# Patient Record
Sex: FEMALE | Race: BLACK OR AFRICAN AMERICAN | NOT HISPANIC OR LATINO | URBAN - METROPOLITAN AREA
[De-identification: names, ages, dates, MRNs, and addresses within clinical notes are randomized per-mention and may not be internally consistent; named-entity substitution may affect disease eponyms.]

---

## 2022-01-20 ENCOUNTER — LAB OUTSIDE AN ENCOUNTER (OUTPATIENT)
Dept: URBAN - METROPOLITAN AREA CLINIC 109 | Facility: CLINIC | Age: 72
End: 2022-01-20

## 2022-01-20 ENCOUNTER — WEB ENCOUNTER (OUTPATIENT)
Dept: URBAN - METROPOLITAN AREA CLINIC 109 | Facility: CLINIC | Age: 72
End: 2022-01-20

## 2022-01-20 ENCOUNTER — OFFICE VISIT (OUTPATIENT)
Dept: URBAN - METROPOLITAN AREA CLINIC 109 | Facility: CLINIC | Age: 72
End: 2022-01-20
Payer: MEDICARE

## 2022-01-20 DIAGNOSIS — I10 PRIMARY HYPERTENSION: ICD-10-CM

## 2022-01-20 DIAGNOSIS — K50.819 CROHN'S DISEASE OF SMALL AND LARGE INTESTINES WITH COMPLICATION: ICD-10-CM

## 2022-01-20 PROBLEM — 59621000: Status: ACTIVE | Noted: 2022-01-20

## 2022-01-20 PROCEDURE — 99204 OFFICE O/P NEW MOD 45 MIN: CPT | Performed by: INTERNAL MEDICINE

## 2022-01-20 PROCEDURE — 99203 OFFICE O/P NEW LOW 30 MIN: CPT | Performed by: INTERNAL MEDICINE

## 2022-01-20 RX ORDER — LOSARTAN POTASSIUM 50 MG/1
1 TABLET TABLET, FILM COATED ORAL ONCE A DAY
Status: ACTIVE | COMMUNITY

## 2022-01-20 NOTE — HPI-TODAY'S VISIT:
The patient is referred for care of suspected Crohn's disease.   She has been having sx since the 1980s and initially felt to have UC when living in [place]. The patient had been under the care of Dr. Kai Singh subsequently and had multiple normal colonoscopies and no evidence of UC.  A small bowel series in 2012 revealed distal terminal ileal narrowing suggestive of Crohns disease and she underwent attempted single balloon enteroscopy from below by Dr. Alonso Egan.  The TI could not be intubated but a limited view suggested inflammation and biopsies taken proving inflammation.  She was subsequently referred to a surgeon, Dr. Melvin Benavides who recommended right hemicolectomy for her symptomatic stricturing disease. The patient reports having surgery in 2012 or 2013. Since then she has had mostly diarrhea.  No pain or bleeding.  Stools are meal triggered typically 3x daily.  She has been on Welchol which does help.  She takes 2 or 3 daily and takes Imodium. She reports not having a colonoscopy for several years and the last told that Crohn's is probably still there. Currently she has never been on medications for Crohn's.  Entyvio was suggested, but patient was not comfortable with this idea. She smokes marijuana for relaxation. No tobacco use generally.

## 2022-02-18 PROBLEM — 71833008: Status: ACTIVE | Noted: 2022-01-20

## 2022-02-23 ENCOUNTER — TELEPHONE ENCOUNTER (OUTPATIENT)
Dept: URBAN - METROPOLITAN AREA CLINIC 109 | Facility: CLINIC | Age: 72
End: 2022-02-23

## 2022-03-02 ENCOUNTER — CLAIMS CREATED FROM THE CLAIM WINDOW (OUTPATIENT)
Dept: URBAN - METROPOLITAN AREA CLINIC 4 | Facility: CLINIC | Age: 72
End: 2022-03-02
Payer: MEDICARE

## 2022-03-02 ENCOUNTER — OFFICE VISIT (OUTPATIENT)
Dept: URBAN - METROPOLITAN AREA SURGERY CENTER 23 | Facility: SURGERY CENTER | Age: 72
End: 2022-03-02
Payer: MEDICARE

## 2022-03-02 DIAGNOSIS — K50.812 CROHN'S DISEASE OF BOTH SMALL AND LARGE INTESTINE WITH INTESTINAL OBSTRUCTION: ICD-10-CM

## 2022-03-02 DIAGNOSIS — K91.89 OTHER POSTPROCEDURAL COMPLICATIONS AND DISORDERS OF DIGESTIVE SYSTEM: ICD-10-CM

## 2022-03-02 PROCEDURE — 45380 COLONOSCOPY AND BIOPSY: CPT | Performed by: INTERNAL MEDICINE

## 2022-03-02 PROCEDURE — 88342 IMHCHEM/IMCYTCHM 1ST ANTB: CPT | Performed by: PATHOLOGY

## 2022-03-02 PROCEDURE — G8907 PT DOC NO EVENTS ON DISCHARG: HCPCS | Performed by: INTERNAL MEDICINE

## 2022-03-02 PROCEDURE — 88305 TISSUE EXAM BY PATHOLOGIST: CPT | Performed by: PATHOLOGY

## 2022-03-02 PROCEDURE — 88341 IMHCHEM/IMCYTCHM EA ADD ANTB: CPT | Performed by: PATHOLOGY

## 2022-03-02 RX ORDER — LOSARTAN POTASSIUM 50 MG/1
1 TABLET TABLET, FILM COATED ORAL ONCE A DAY
Status: ACTIVE | COMMUNITY

## 2022-05-10 ENCOUNTER — LAB OUTSIDE AN ENCOUNTER (OUTPATIENT)
Dept: URBAN - METROPOLITAN AREA CLINIC 109 | Facility: CLINIC | Age: 72
End: 2022-05-10

## 2022-05-10 ENCOUNTER — OFFICE VISIT (OUTPATIENT)
Dept: URBAN - METROPOLITAN AREA CLINIC 109 | Facility: CLINIC | Age: 72
End: 2022-05-10
Payer: MEDICARE

## 2022-05-10 DIAGNOSIS — F40.240 CLAUSTROPHOBIA: ICD-10-CM

## 2022-05-10 DIAGNOSIS — K50.019 CROHN'S DISEASE OF ILEUM WITH COMPLICATION: ICD-10-CM

## 2022-05-10 PROBLEM — 19887002: Status: ACTIVE | Noted: 2022-05-10

## 2022-05-10 PROCEDURE — 99213 OFFICE O/P EST LOW 20 MIN: CPT | Performed by: INTERNAL MEDICINE

## 2022-05-10 RX ORDER — LOSARTAN POTASSIUM 50 MG/1
1 TABLET TABLET, FILM COATED ORAL ONCE A DAY
Status: ACTIVE | COMMUNITY

## 2022-05-10 NOTE — HPI-TODAY'S VISIT:
The patient is referred for follow up care of Crohn's disease.   Recent colonoscopy 2 months ago revealed a right delma colectomy and an ulcerated, strictured ileocolonic anastomosis which could not be traversed with the scope c/w Crohns.  Biopsies revealed chronic inflammation c/w IBD. Currently, has 2-3 BMs daily.  No blood in the stool.  No abdominal pain. Weight is stable.  She has been having sx since the 1980s and initially felt to have UC when living in [place].  A small bowel series in 2012 revealed distal terminal ileal narrowing suggestive of Crohns disease and she underwent attempted single balloon enteroscopy from below by Dr. Alonso Egan.  The TI could not be intubated but a limited view suggested inflammation and biopsies taken proving inflammation.  She was subsequently referred to a surgeon, Dr. Melvin Benavides who recommended right hemicolectomy for her symptomatic stricturing disease. The patient reports having surgery around 2015. Since then she has had mostly diarrhea.  No pain or bleeding.  Stools are meal triggered typically 3x daily.  She has been on Welchol which does help.  She takes 2 or 3 daily and takes Imodium. She reports not having a colonoscopy for several years and the last told that Crohn's is probably still there. Currently she has never been on medications for Crohn's.  Entyvio was suggested in the past, but patient was not comfortable with this idea. She smokes marijuana for relaxation. No tobacco use generally.

## 2022-06-16 ENCOUNTER — OFFICE VISIT (OUTPATIENT)
Dept: URBAN - METROPOLITAN AREA CLINIC 109 | Facility: CLINIC | Age: 72
End: 2022-06-16

## 2022-08-30 ENCOUNTER — OFFICE VISIT (OUTPATIENT)
Dept: URBAN - METROPOLITAN AREA CLINIC 109 | Facility: CLINIC | Age: 72
End: 2022-08-30

## 2022-08-30 RX ORDER — LOSARTAN POTASSIUM 50 MG/1
1 TABLET TABLET, FILM COATED ORAL ONCE A DAY
COMMUNITY

## 2022-09-26 ENCOUNTER — OFFICE VISIT (OUTPATIENT)
Dept: URBAN - METROPOLITAN AREA CLINIC 109 | Facility: CLINIC | Age: 72
End: 2022-09-26

## 2022-09-26 RX ORDER — LOSARTAN POTASSIUM 50 MG/1
1 TABLET TABLET, FILM COATED ORAL ONCE A DAY
COMMUNITY

## 2022-10-03 ENCOUNTER — OFFICE VISIT (OUTPATIENT)
Dept: URBAN - METROPOLITAN AREA CLINIC 109 | Facility: CLINIC | Age: 72
End: 2022-10-03
Payer: MEDICARE

## 2022-10-03 VITALS
HEART RATE: 68 BPM | WEIGHT: 166 LBS | BODY MASS INDEX: 26.68 KG/M2 | HEIGHT: 66 IN | DIASTOLIC BLOOD PRESSURE: 85 MMHG | SYSTOLIC BLOOD PRESSURE: 135 MMHG | TEMPERATURE: 97 F

## 2022-10-03 DIAGNOSIS — K50.019 CROHN'S DISEASE OF ILEUM WITH COMPLICATION: ICD-10-CM

## 2022-10-03 PROBLEM — 38106008: Status: ACTIVE | Noted: 2022-05-10

## 2022-10-03 PROCEDURE — 99213 OFFICE O/P EST LOW 20 MIN: CPT | Performed by: INTERNAL MEDICINE

## 2022-10-03 RX ORDER — LOSARTAN POTASSIUM 50 MG/1
1 TABLET TABLET, FILM COATED ORAL ONCE A DAY
Status: ACTIVE | COMMUNITY

## 2022-10-03 NOTE — HPI-TODAY'S VISIT:
The patient returns for follow up care of Crohn's disease.   MR enterography last month revealed multiple areas of thickening of the distal small bowel, although no fistulas. Finding were c/w Crohns recurrence of the distal ileum.  Colonoscopy in March 2022 revealed a right delma colectomy and an ulcerated, strictured ileocolonic anastomosis which could not be traversed with the scope c/w Crohns.  Biopsies revealed chronic inflammation c/w IBD. Takes Welchol for diarrhea, otherwise has 3-5 BMs per day.  Also takes Imodium PRN. No blood in the stool or abdominal pain. Weight is stable.  She has been having sx since the 1980s and initially felt to have UC when living in [place].  A small bowel series in 2012 revealed distal terminal ileal narrowing suggestive of Crohns disease and she underwent attempted single balloon enteroscopy from below by Dr. Alonso Egan.  The TI could not be intubated but a limited view suggested inflammation and biopsies taken proving inflammation.  She was subsequently referred to a surgeon, Dr. Melvin Benavides who recommended right hemicolectomy for her symptomatic stricturing disease. The patient reports having surgery around 2015.   Currently she has never been on medications for Crohn's.  Entyvio was suggested in the past, but patient was not comfortable with this idea. She smokes marijuana for relaxation. No tobacco use generally.

## 2022-10-20 LAB
A/G RATIO: 1.3
ALBUMIN: 4
ALKALINE PHOSPHATASE: 65
ALT (SGPT): 18
AST (SGOT): 15
BILIRUBIN, TOTAL: 0.4
BUN/CREATININE RATIO: (no result)
BUN: 13
C-REACTIVE PROTEIN, QUANT: 6.4
CALCIUM: 10.5
CARBON DIOXIDE, TOTAL: 25
CHLORIDE: 107
CREATININE: 0.63
EGFR: 95
GLOBULIN, TOTAL: 3
GLUCOSE: 95
HEPATITIS B CORE AB TOTAL: (no result)
HEPATITIS B SURFACE ANTIGEN: (no result)
POTASSIUM: 4.6
PROTEIN, TOTAL: 7
SODIUM: 142

## 2023-12-11 ENCOUNTER — OFFICE VISIT (OUTPATIENT)
Dept: URBAN - METROPOLITAN AREA CLINIC 109 | Facility: CLINIC | Age: 73
End: 2023-12-11
Payer: MEDICARE

## 2023-12-11 ENCOUNTER — LAB OUTSIDE AN ENCOUNTER (OUTPATIENT)
Dept: URBAN - METROPOLITAN AREA CLINIC 109 | Facility: CLINIC | Age: 73
End: 2023-12-11

## 2023-12-11 VITALS
DIASTOLIC BLOOD PRESSURE: 77 MMHG | WEIGHT: 170.6 LBS | TEMPERATURE: 97.7 F | BODY MASS INDEX: 27.42 KG/M2 | HEART RATE: 66 BPM | HEIGHT: 66 IN

## 2023-12-11 DIAGNOSIS — K50.019 CROHN'S DISEASE OF ILEUM WITH COMPLICATION: ICD-10-CM

## 2023-12-11 PROCEDURE — 99213 OFFICE O/P EST LOW 20 MIN: CPT | Performed by: INTERNAL MEDICINE

## 2023-12-11 RX ORDER — LOSARTAN POTASSIUM 50 MG/1
1 TABLET TABLET, FILM COATED ORAL ONCE A DAY
Status: ACTIVE | COMMUNITY

## 2023-12-11 NOTE — PHYSICAL EXAM GASTROINTESTINAL
Abdomen , soft, nontender, nondistended , no guarding or rigidity , no masses palpable , normal bowel sounds , Liver and Spleen,  no hepatosplenomegaly , liver nontender, midline scar

## 2023-12-11 NOTE — HPI-TODAY'S VISIT:
The patient returns for care of ileal Crohns disease.  She has right hemicolectomy in 2015 and has recurrent stricturing disease of the ileum found at her last colonoscopy in 2022. MR enterography revealed distal ileal stricturing and some small bowel dilation. She was again advised to consider biologic therapy when last seen. Currently, Has1-2 BMs per day [place] 4-5 range. No blood in the stool.  Rarely has crampy pain. Weight is stable/increasing. On Welchol for the loose stools only and never on biologics by choice.  [facility] 10/3/22 visit: The patient returns for follow up care of Crohn's disease.   MR enterography last month revealed multiple areas of thickening of the distal small bowel, although no fistulas. Finding were c/w Crohns recurrence of the distal ileum.  Colonoscopy in March 2022 revealed a right delma colectomy and an ulcerated, strictured ileocolonic anastomosis which could not be traversed with the scope c/w Crohns.  Biopsies revealed chronic inflammation c/w IBD. Takes Welchol for diarrhea, otherwise has 3-5 BMs per day.  Also takes Imodium PRN. No blood in the stool or abdominal pain. Weight is stable.  She has been having sx since the 1980s and initially felt to have UC when living in [place].  A small bowel series in 2012 revealed distal terminal ileal narrowing suggestive of Crohns disease and she underwent attempted single balloon enteroscopy from below by Dr. Alonso Egan.  The TI could not be intubated but a limited view suggested inflammation and biopsies taken proving inflammation.  She was subsequently referred to a surgeon, Dr. Melvin Benavides who recommended right hemicolectomy for her symptomatic stricturing disease. The patient reports having surgery around 2015.   Currently she has never been on medications for Crohn's.  Entyvio was suggested in the past, but patient was not comfortable with this idea. She smokes marijuana for relaxation. No tobacco use generally.

## 2023-12-15 LAB
A/G RATIO: 1.3
ALBUMIN: 4
ALKALINE PHOSPHATASE: 55
ALT (SGPT): 21
AST (SGOT): 16
BILIRUBIN, TOTAL: 0.4
BUN/CREATININE RATIO: (no result)
BUN: 12
CALCIUM: 10.6
CARBON DIOXIDE, TOTAL: 26
CHLORIDE: 107
CREATININE: 0.7
EGFR: 92
GLOBULIN, TOTAL: 3
GLUCOSE: 81
HEMATOCRIT: 39.3
HEMOGLOBIN: 13.1
HS CRP: 2.2
MCH: 32.2
MCHC: 33.3
MCV: 96.6
MPV: 10.1
PLATELET COUNT: 304
POTASSIUM: 4.8
PROTEIN, TOTAL: 7
RDW: 13
RED BLOOD CELL COUNT: 4.07
SODIUM: 141
WHITE BLOOD CELL COUNT: 5.3

## 2023-12-22 ENCOUNTER — TELEPHONE ENCOUNTER (OUTPATIENT)
Dept: URBAN - METROPOLITAN AREA CLINIC 118 | Facility: CLINIC | Age: 73
End: 2023-12-22

## 2024-01-17 ENCOUNTER — LAB OUTSIDE AN ENCOUNTER (OUTPATIENT)
Dept: URBAN - METROPOLITAN AREA CLINIC 109 | Facility: CLINIC | Age: 74
End: 2024-01-17

## 2024-01-29 ENCOUNTER — TELEPHONE ENCOUNTER (OUTPATIENT)
Dept: URBAN - METROPOLITAN AREA CLINIC 109 | Facility: CLINIC | Age: 74
End: 2024-01-29

## 2024-02-07 LAB
HEPATITIS B CORE AB TOTAL: (no result)
HEPATITIS B SURFACE AB IMMUNITY, QN: <5
HEPATITIS B SURFACE ANTIGEN: (no result)
MITOGEN-NIL: >10
QUANTIFERON NIL VALUE: 0.03
QUANTIFERON TB1 AG VALUE: 0
QUANTIFERON TB2 AG VALUE: 0
QUANTIFERON-TB GOLD PLUS: NEGATIVE

## 2024-02-13 LAB — CALPROTECTIN, FECAL: 520

## 2024-02-21 ENCOUNTER — OV EP (OUTPATIENT)
Dept: URBAN - METROPOLITAN AREA CLINIC 118 | Facility: CLINIC | Age: 74
End: 2024-02-21

## 2024-02-21 ENCOUNTER — LAB (OUTPATIENT)
Dept: URBAN - METROPOLITAN AREA CLINIC 118 | Facility: CLINIC | Age: 74
End: 2024-02-21

## 2024-02-21 VITALS
DIASTOLIC BLOOD PRESSURE: 79 MMHG | HEIGHT: 66 IN | WEIGHT: 168 LBS | TEMPERATURE: 97.5 F | SYSTOLIC BLOOD PRESSURE: 138 MMHG | HEART RATE: 66 BPM | BODY MASS INDEX: 27 KG/M2

## 2024-02-21 RX ORDER — LOSARTAN POTASSIUM 50 MG/1
1 TABLET TABLET, FILM COATED ORAL ONCE A DAY
Status: ACTIVE | COMMUNITY

## 2024-02-21 RX ORDER — ALPRAZOLAM 0.5 MG/1
1 TABLET TABLET ORAL TWICE A DAY
Status: ACTIVE | COMMUNITY

## 2024-03-11 ENCOUNTER — OV EP (OUTPATIENT)
Dept: URBAN - METROPOLITAN AREA CLINIC 109 | Facility: CLINIC | Age: 74
End: 2024-03-11

## 2024-03-25 ENCOUNTER — COLON (OUTPATIENT)
Dept: URBAN - METROPOLITAN AREA SURGERY CENTER 23 | Facility: SURGERY CENTER | Age: 74
End: 2024-03-25

## 2024-03-29 ENCOUNTER — OV EP (OUTPATIENT)
Dept: URBAN - METROPOLITAN AREA CLINIC 118 | Facility: CLINIC | Age: 74
End: 2024-03-29

## 2024-06-24 ENCOUNTER — OFFICE VISIT (OUTPATIENT)
Dept: URBAN - METROPOLITAN AREA SURGERY CENTER 23 | Facility: SURGERY CENTER | Age: 74
End: 2024-06-24

## 2024-07-08 ENCOUNTER — OFFICE VISIT (OUTPATIENT)
Dept: URBAN - METROPOLITAN AREA SURGERY CENTER 23 | Facility: SURGERY CENTER | Age: 74
End: 2024-07-08

## 2024-08-05 ENCOUNTER — OFFICE VISIT (OUTPATIENT)
Dept: URBAN - METROPOLITAN AREA SURGERY CENTER 23 | Facility: SURGERY CENTER | Age: 74
End: 2024-08-05

## 2024-08-14 ENCOUNTER — CLAIMS CREATED FROM THE CLAIM WINDOW (OUTPATIENT)
Dept: URBAN - METROPOLITAN AREA SURGERY CENTER 23 | Facility: SURGERY CENTER | Age: 74
End: 2024-08-14
Payer: COMMERCIAL

## 2024-08-14 ENCOUNTER — CLAIMS CREATED FROM THE CLAIM WINDOW (OUTPATIENT)
Dept: URBAN - METROPOLITAN AREA CLINIC 4 | Facility: CLINIC | Age: 74
End: 2024-08-14
Payer: COMMERCIAL

## 2024-08-14 DIAGNOSIS — K63.3 ULCER OF INTESTINE: ICD-10-CM

## 2024-08-14 DIAGNOSIS — K50.012 CROHN'S DISEASE OF DUODENUM WITH INTESTINAL OBSTRUCTION: ICD-10-CM

## 2024-08-14 DIAGNOSIS — K50.012 CROHN'S DISEASE OF SMALL INTESTINE WITH INTESTINAL OBSTRUCTION: ICD-10-CM

## 2024-08-14 DIAGNOSIS — K63.89 OTHER SPECIFIED DISEASES OF INTESTINE: ICD-10-CM

## 2024-08-14 PROCEDURE — 88305 TISSUE EXAM BY PATHOLOGIST: CPT | Performed by: PATHOLOGY

## 2024-08-14 PROCEDURE — 45380 COLONOSCOPY AND BIOPSY: CPT | Performed by: INTERNAL MEDICINE

## 2024-08-14 PROCEDURE — 88342 IMHCHEM/IMCYTCHM 1ST ANTB: CPT | Performed by: PATHOLOGY

## 2024-08-14 PROCEDURE — 00811 ANES LWR INTST NDSC NOS: CPT | Performed by: NURSE ANESTHETIST, CERTIFIED REGISTERED

## 2024-08-14 PROCEDURE — 88341 IMHCHEM/IMCYTCHM EA ADD ANTB: CPT | Performed by: PATHOLOGY

## 2024-08-14 RX ORDER — LOSARTAN POTASSIUM 50 MG/1
1 TABLET TABLET, FILM COATED ORAL ONCE A DAY
Status: ACTIVE | COMMUNITY

## 2024-08-14 RX ORDER — ALPRAZOLAM 0.5 MG/1
1 TABLET TABLET ORAL TWICE A DAY
Status: ACTIVE | COMMUNITY